# Patient Record
Sex: MALE | Race: WHITE | NOT HISPANIC OR LATINO | Employment: OTHER | URBAN - METROPOLITAN AREA
[De-identification: names, ages, dates, MRNs, and addresses within clinical notes are randomized per-mention and may not be internally consistent; named-entity substitution may affect disease eponyms.]

---

## 2017-01-06 ENCOUNTER — GENERIC CONVERSION - ENCOUNTER (OUTPATIENT)
Dept: OTHER | Facility: OTHER | Age: 72
End: 2017-01-06

## 2017-01-26 ENCOUNTER — GENERIC CONVERSION - ENCOUNTER (OUTPATIENT)
Dept: OTHER | Facility: OTHER | Age: 72
End: 2017-01-26

## 2017-03-30 ENCOUNTER — ALLSCRIPTS OFFICE VISIT (OUTPATIENT)
Dept: OTHER | Facility: OTHER | Age: 72
End: 2017-03-30

## 2017-03-30 DIAGNOSIS — K51.90 ULCERATIVE COLITIS WITHOUT COMPLICATIONS (HCC): ICD-10-CM

## 2017-03-30 DIAGNOSIS — G47.00 INSOMNIA: ICD-10-CM

## 2017-03-30 DIAGNOSIS — I51.9 HEART DISEASE: ICD-10-CM

## 2017-03-30 DIAGNOSIS — R22.1 LOCALIZED SWELLING, MASS OR LUMP OF NECK: ICD-10-CM

## 2017-03-30 DIAGNOSIS — E78.5 HYPERLIPIDEMIA: ICD-10-CM

## 2017-03-30 DIAGNOSIS — D72.819 DECREASED WHITE BLOOD CELL COUNT: ICD-10-CM

## 2017-04-17 ENCOUNTER — ALLSCRIPTS OFFICE VISIT (OUTPATIENT)
Dept: OTHER | Facility: OTHER | Age: 72
End: 2017-04-17

## 2017-04-20 ENCOUNTER — GENERIC CONVERSION - ENCOUNTER (OUTPATIENT)
Dept: OTHER | Facility: OTHER | Age: 72
End: 2017-04-20

## 2017-04-20 LAB
BASOPHILS # BLD AUTO: 0.6 %
BASOPHILS # BLD AUTO: 30 CELLS/UL (ref 0–200)
DEPRECATED RDW RBC AUTO: 14.5 % (ref 11–15)
EOSINOPHIL # BLD AUTO: 340 CELLS/UL (ref 15–500)
EOSINOPHIL # BLD AUTO: 6.8 %
HCT VFR BLD AUTO: 43 % (ref 38.5–50)
HGB BLD-MCNC: 14.3 G/DL (ref 13.2–17.1)
LYMPHOCYTES # BLD AUTO: 1325 CELLS/UL (ref 850–3900)
LYMPHOCYTES # BLD AUTO: 26.5 %
MCH RBC QN AUTO: 30.6 PG (ref 27–33)
MCHC RBC AUTO-ENTMCNC: 33.3 G/DL (ref 32–36)
MCV RBC AUTO: 92.1 FL (ref 80–100)
MONOCYTES # BLD AUTO: 445 CELLS/UL (ref 200–950)
MONOCYTES (HISTORICAL): 8.9 %
NEUTROPHILS # BLD AUTO: 2860 CELLS/UL (ref 1500–7800)
NEUTROPHILS # BLD AUTO: 57.2 %
PLATELET # BLD AUTO: 182 THOUSAND/UL (ref 140–400)
PMV BLD AUTO: 8.7 FL (ref 7.5–12.5)
RBC # BLD AUTO: 4.67 MILLION/UL (ref 4.2–5.8)
WBC # BLD AUTO: 5 THOUSAND/UL (ref 3.8–10.8)

## 2017-04-25 ENCOUNTER — HOSPITAL ENCOUNTER (OUTPATIENT)
Dept: ULTRASOUND IMAGING | Facility: MEDICAL CENTER | Age: 72
Discharge: HOME | End: 2017-04-25
Payer: MEDICARE

## 2017-04-25 DIAGNOSIS — R22.1 LOCALIZED SWELLING, MASS OR LUMP OF NECK: ICD-10-CM

## 2017-04-25 PROCEDURE — 76536 US EXAM OF HEAD AND NECK: CPT

## 2017-04-26 ENCOUNTER — GENERIC CONVERSION - ENCOUNTER (OUTPATIENT)
Dept: OTHER | Facility: OTHER | Age: 72
End: 2017-04-26

## 2017-05-09 ENCOUNTER — GENERIC CONVERSION - ENCOUNTER (OUTPATIENT)
Dept: OTHER | Facility: OTHER | Age: 72
End: 2017-05-09

## 2017-05-10 ENCOUNTER — APPOINTMENT (OUTPATIENT)
Dept: AUDIOLOGY | Age: 72
End: 2017-05-10
Payer: MEDICARE

## 2017-05-10 ENCOUNTER — GENERIC CONVERSION - ENCOUNTER (OUTPATIENT)
Dept: OTHER | Facility: OTHER | Age: 72
End: 2017-05-10

## 2017-05-10 DIAGNOSIS — I51.9 HEART DISEASE: ICD-10-CM

## 2017-05-10 DIAGNOSIS — G47.00 INSOMNIA: ICD-10-CM

## 2017-05-10 DIAGNOSIS — D72.819 DECREASED WHITE BLOOD CELL COUNT: ICD-10-CM

## 2017-05-10 DIAGNOSIS — K51.90 ULCERATIVE COLITIS WITHOUT COMPLICATIONS (HCC): ICD-10-CM

## 2017-05-10 DIAGNOSIS — E78.5 HYPERLIPIDEMIA: ICD-10-CM

## 2017-05-10 PROCEDURE — 92557 COMPREHENSIVE HEARING TEST: CPT | Performed by: AUDIOLOGIST

## 2017-05-10 PROCEDURE — 92567 TYMPANOMETRY: CPT | Performed by: AUDIOLOGIST

## 2017-09-06 ENCOUNTER — LAB CONVERSION - ENCOUNTER (OUTPATIENT)
Dept: OTHER | Facility: OTHER | Age: 72
End: 2017-09-06

## 2017-09-06 LAB
A/G RATIO (HISTORICAL): 2.1 (CALC) (ref 1–2.5)
ALBUMIN SERPL BCP-MCNC: 4.6 G/DL (ref 3.6–5.1)
ALP SERPL-CCNC: 44 U/L (ref 40–115)
ALT SERPL W P-5'-P-CCNC: 16 U/L (ref 9–46)
AST SERPL W P-5'-P-CCNC: 21 U/L (ref 10–35)
BILIRUB SERPL-MCNC: 0.8 MG/DL (ref 0.2–1.2)
BUN SERPL-MCNC: 18 MG/DL (ref 7–25)
BUN/CREA RATIO (HISTORICAL): ABNORMAL (CALC) (ref 6–22)
CALCIUM (ADJUSTED FOR ALBUMIN) (HISTORICAL): 10 MG/DL (CALC) (ref 8.6–10.2)
CALCIUM SERPL-MCNC: 10.1 MG/DL (ref 8.6–10.3)
CHLORIDE SERPL-SCNC: 104 MMOL/L (ref 98–110)
CHOLEST SERPL-MCNC: 181 MG/DL
CHOLEST/HDLC SERPL: 2.5 (CALC)
CO2 SERPL-SCNC: 28 MMOL/L (ref 20–31)
CREAT SERPL-MCNC: 1.17 MG/DL (ref 0.7–1.18)
EGFR AFRICAN AMERICAN (HISTORICAL): 72 ML/MIN/1.73M2
EGFR-AMERICAN CALC (HISTORICAL): 62 ML/MIN/1.73M2
GAMMA GLOBULIN (HISTORICAL): 2.2 G/DL (CALC) (ref 1.9–3.7)
GLUCOSE (HISTORICAL): 100 MG/DL (ref 65–99)
HDLC SERPL-MCNC: 71 MG/DL
LDL CHOLESTEROL (HISTORICAL): 99 MG/DL (CALC)
NON-HDL-CHOL (CHOL-HDL) (HISTORICAL): 110 MG/DL (CALC)
POTASSIUM SERPL-SCNC: 4 MMOL/L (ref 3.5–5.3)
PROSTATE SPECIFIC ANTIGEN TOTAL (HISTORICAL): 0.7 NG/ML
SODIUM SERPL-SCNC: 141 MMOL/L (ref 135–146)
TOTAL PROTEIN (HISTORICAL): 6.8 G/DL (ref 6.1–8.1)
TRIGL SERPL-MCNC: 37 MG/DL
TSH SERPL DL<=0.05 MIU/L-ACNC: 1.05 MIU/L (ref 0.4–4.5)

## 2017-09-13 ENCOUNTER — ALLSCRIPTS OFFICE VISIT (OUTPATIENT)
Dept: OTHER | Facility: OTHER | Age: 72
End: 2017-09-13

## 2017-10-26 NOTE — PROGRESS NOTES
Assessment  1  Insomnia (780 52) (G47 00)   2  Hyperlipidemia (272 4) (E78 5)   3  Ulcerative colitis without complications (676 9) (I54 01)    Plan  Insomnia    · Zolpidem Tartrate 5 MG Oral Tablet; TAKE 1 TABLET AT BEDTIME AS NEEDED    Discussion/Summary    #1  Insomnia? patient was given a prescription for Ambien 5 mg one at bedtime when necessary #10  He will use this prescription on his drive to [facility] so that he would be able to sleep at night  Hyperlipidemia? cholesterol numbers are stable with LDL now 99 down from 125  He is not on any medication for his cholesterol  Ulcerative colitis?stable  to keep his cholesterol numbers down and his colitis in check  was not given a follow-up because he is moving to [facility] permanently in the next week or so  Possible side effects of new medications were reviewed with the patient/guardian today  The treatment plan was reviewed with the patient/guardian  The patient/guardian understands and agrees with the treatment plan     Self Referrals: No      Chief Complaint  Follow up hyperlipidemiaankle pain - no specific injuryon neck he would like checked immunization declined - [facility]      History of Present Illness  This is a 72-year-old male who comes in for his regular six-month visit  He has 2 minor problems  The first problem involves right ankle pain and minor swelling which occurred after he was sitting on a stool at the bar in a restaurant  When he got up he was have an ankle pain after sitting for a prolonged period of time  The ankle has swollen but recently it is starting now to get better in that the swelling is down and the pain is negligent  He also has a small raised spot on the side of his neck and was wondering if he should go to dermatology  He is currently moving out of the area and relocating to [facility] within a week and will seek a dermatologist and a new physician for his care out there   Reviewing his labs his cholesterol numbers are stable with the LDL coming down from 125-99  His PSA stayed the same at 0 7 and his glucose went up from   His TSH is within normal range  He is requesting a small scription for Ambien because he is driving out to [facility] and he would like to get a good night sleep when he pulls over to sleep for the night  He has had Ambien 5 mg in the past and is requesting 10 tablets  His ulcerative colitis is stable at the present time  Review of Systems    Constitutional: No fever or chills, feels well, no tiredness, no recent weight gain or weight loss  ENT: no complaints of earache, no hearing loss, no nosebleeds, no nasal discharge, no sore throat, no hoarseness  Cardiovascular: No complaints of slow heart rate, no fast heart rate, no chest pain, no palpitations, no leg claudication, no lower extremity  Respiratory: No complaints of shortness of breath, no wheezing, no cough, no SOB on exertion, no orthopnea or PND  Gastrointestinal: No complaints of abdominal pain, no constipation, no nausea or vomiting, no diarrhea or bloody stools  Musculoskeletal: Slight swelling of right ankle, but-- as noted in HPI,-- no arthralgias,-- no joint swelling,-- no limb pain,-- no myalgias-- and-- no limb swelling  Integumentary: Odin Herd raised lesion right side of neck, but-- as noted in HPI,-- no rashes,-- no itching,-- no dry skin-- and-- no skin wound--    The patient presents with complaints of gradual onset of mild right neck skin lesion, described as raised and white  ROS reviewed  Active Problems  1  Bulging lumbar disc (722 10) (M51 26)   2  Cerumen impaction (380 4) (H61 20)   3  Colonoscopy (Fiberoptic) Screening   4  Decreased hearing of both ears (389 9) (H91 93)   5  Diastolic dysfunction (872 5) (I51 9)   6  Encounter for prostate cancer screening (V76 44) (Z12 5)   7  Encounter for screening colonoscopy (V76 51) (Z12 11)   8  Foot pain (729 5) (M79 673)   9  Ganglion of right wrist (727 41) (M67 431)   10  Hyperlipidemia (272 4) (E78 5)   11  Insomnia (780 52) (G47 00)   12  Leukopenia (288 50) (D72 819)   13  Lower back pain (724 2) (M54 5)   14  Lump in throat (784 2) (R22 1)   15  Myalgia and myositis (729 1)   16  Neutropenia (288 00) (D70 9)   17  Palpitations (785 1) (R00 2)   18  Paresthesia of hand (782 0) (R20 2)   19  Sciatica (724 3) (M54 30)   20  Screening for genitourinary condition (V81 6) (Z13 89)   21  Screening for glaucoma (V80 1) (Z13 5)   22  Screening for neurological condition (V80 09) (Z13 89)   23  Sensation of lump in throat (784 99) (R22 1)   24  Testicular discomfort (608 9) (N50 819)   25  Ulcerative colitis without complications (496 3) (Q17 53)    Past Medical History  1  History of Alt[place] sickness prophylaxis (V07 8) (Z29 8)   2  History of acute sinusitis (V12 69) (Z87 09)   3  History of diarrhea (V12 79) (Z87 898)   4  History of Malignant Neoplasm Of The Bladder (V10 51)   5  History of Nephrolithiasis (V13 01)    The active problems and past medical history were reviewed and updated today  Surgical History  1  History of Tonsillectomy With Adenoidectomy    The surgical history was reviewed and updated today  Family History  Mother    1  Family history of Gastric Cancer (V16 0)  Father    2  Family history of Hypertension   3  Family history of Pneumonia  Maternal Grandfather    4  Family history of Stroke Syndrome (V17 1)  Maternal Uncle    5  Family history of Tuberculosis    The family history was reviewed and updated today  Social History   · Alcohol drinker   ·    · Never a smoker   · No secondhand smoke exposure (V49 89) (Z78 9)   · Retired  The social history was reviewed and updated today  The social history was reviewed and is unchanged  Current Meds   1  Canasa 1000 MG Rectal Suppository; INSERT 1 SUPPOSITORY RECTALLY TWICE   DAILY; Therapy: 26DHR4930 to (Evaluate:13Qnd1861); Last Rx:30Mar2017 Ordered   2  [facility] Fish Oil CAPS Recorded   3  HydroCHLOROthiazide 25 MG Oral Tablet; Therapy: 84PYQ4340 to Recorded   4  Latanoprost 0 005 % Ophthalmic Solution; Therapy: 13Sep2017 to Recorded   5  Potassium Citrate ER 15 MEQ (1620 MG) Oral Tablet Extended Release; Therapy: (Recorded:26Jan2015) to Recorded    The medication list was reviewed and updated today  Allergies  1  Fluzone High-Dose SUSP    Vitals  Vital Signs    Recorded: 13Sep2017 08:39AM   Heart Rate 80, L Radial   Pulse Quality Regular, L Radial   Systolic 760, LUE, Sitting   Diastolic 64, LUE, Sitting     Physical Exam    Constitutional   General appearance: No acute distress, well appearing and well nourished  Ears, Nose, Mouth, and Throat   External inspection of ears and nose: Normal     Otoscopic examination: Tympanic membrance translucent with normal light reflex  Canals patent without erythema  Oropharynx: Normal with no erythema, edema, exudate or lesions  Pulmonary   Auscultation of lungs: Clear to auscultation, equal breath sounds bilaterally, no wheezes, no rales, no rhonci  Cardiovascular   Auscultation of heart: Normal rate and rhythm, normal S1 and S2, without murmurs  Abdomen   Abdomen: Non-tender, no masses  Liver and spleen: No hepatomegaly or splenomegaly  Lymphatic   Palpation of lymph nodes in neck: No lymphadenopathy  Musculoskeletal   Gait and station: Normal     Inspection/palpation of joints, bones, and muscles: Abnormal  -- Minor swelling of right ankle with good range of motion and nontender to palpation  Pulses equal bilaterally     Psychiatric   Orientation to person, place and time: Normal     Mood and affect: Normal          Signatures   Electronically signed by : MONI Stuart; Sep 13 2017 11:07AM EST                       (Author)    Electronically signed by : SAMMY Serrano ; Sep 13 2017 12:12PM EST

## 2017-11-17 ENCOUNTER — GENERIC CONVERSION - ENCOUNTER (OUTPATIENT)
Dept: OTHER | Facility: OTHER | Age: 72
End: 2017-11-17

## 2017-11-20 ENCOUNTER — GENERIC CONVERSION - ENCOUNTER (OUTPATIENT)
Dept: OTHER | Facility: OTHER | Age: 72
End: 2017-11-20

## 2018-01-11 NOTE — RESULT NOTES
Verified Results  US THYROID 25Apr2017 10:38AM Laverne Coats Order Number: [id number]    - Patient Instructions: To schedule this appointment, please contact Central Scheduling at [phone]  Test Name Result Flag Reference   US THYROID (Report)     THYROID ULTRASOUND     INDICATION: Sensation of a lump on right side of neck  COMPARISON: None  TECHNIQUE:  Ultrasound of the thyroid was performed with a high frequency linear transducer in transverse and sagittal planes including volumetric imaging sweeps as well as traditional still imaging technique  FINDINGS:   Normal homogeneous smooth echotexture  Right gland: 3 8 x 2 0 x 1 7 cm  No dominant nodules  Left gland: 4 0 x 2 2 x 1 8 cm  No dominant nodules  Isthmus: The isthmus is 0 4 cm in AP dimension             IMPRESSION:      Normal              Workstation performed: [id number]     Signed by:   Astrid Shipley MD   4/26/17

## 2018-01-12 NOTE — MISCELLANEOUS
Message   Recorded as Task   Date: 11/17/2017 02:18 PM, Created By: Sylvia Licona   Task Name: Call Back   Assigned To: Rayshawn ARTIS,TEAM   Regarding Patient: Phong Soriano, Status: Active   CommentOna Steve - 17 Nov 2017 2:18 PM     TASK CREATED  Caller: Self; [phone] (Home)  Pt has medication questions  [phone]   Odilia Kirk - 17 Nov 2017 2:39 PM     TASK EDITED  PT QUESTIONING IF HE STILL NEEDS TO CONTINUE HYDROCHLOROTHIAZIDE, PT NOT SURE WHY HE IS TAKING MED  DR Tevin Gaming TO BE NOTIFIED   Odilia Kirk - 17 Nov 2017 2:39 PM     TASK REASSIGNED: Previously Assigned To Thor FOR URO Kelsey Alvarez - 20 Nov 2017 9:54 AM     TASK EDITED  Patient can stop hctz  Juana Deanne - 20 Nov 2017 9:54 AM     TASK REPLIED TO: Previously Assigned To Preston Sánchez  stop hctz   Mervat Nova - 20 Nov 2017 10:31 AM     TASK EDITED  PT NOTIFIED  Active Problems    1  Bulging lumbar disc (722 10) (M51 26)   2  Cerumen impaction (380 4) (H61 20)   3  Colonoscopy (Fiberoptic) Screening   4  Decreased hearing of both ears (389 9) (H91 93)   5  Diastolic dysfunction (604 3) (I51 9)   6  Encounter for prostate cancer screening (V76 44) (Z12 5)   7  Encounter for screening colonoscopy (V76 51) (Z12 11)   8  Foot pain (729 5) (M79 673)   9  Ganglion of right wrist (727 41) (M67 431)   10  Hyperlipidemia (272 4) (E78 5)   11  Insomnia (780 52) (G47 00)   12  Leukopenia (288 50) (D72 819)   13  Lower back pain (724 2) (M54 5)   14  Lump in throat (784 2) (R22 1)   15  Myalgia and myositis (729 1)   16  Neutropenia (288 00) (D70 9)   17  Palpitations (785 1) (R00 2)   18  Paresthesia of hand (782 0) (R20 2)   19  Sciatica (724 3) (M54 30)   20  Screening for genitourinary condition (V81 6) (Z13 89)   21  Screening for glaucoma (V80 1) (Z13 5)   22  Screening for neurological condition (V80 09) (Z13 89)   23  Sensation of lump in throat (784 99) (R22 1)   24  Testicular discomfort (608 9) (N50 819)   25  Ulcerative colitis without complications (605 2) (H51 48)    Current Meds   1  Canasa 1000 MG Rectal Suppository; INSERT 1 SUPPOSITORY RECTALLY TWICE   DAILY; Therapy: 46AOT9820 to (Evaluate:20Apr2017); Last Rx:30Mar2017 Ordered   2  [facility] Fish Oil CAPS Recorded   3  HydroCHLOROthiazide 25 MG Oral Tablet; Therapy: 03DCS3942 to Recorded   4  Latanoprost 0 005 % Ophthalmic Solution; Therapy: 51Jpc3933 to Recorded   5  Potassium Citrate ER 15 MEQ (1620 MG) Oral Tablet Extended Release; Therapy: (ISXXUUAL:25BGW5793) to Recorded   6  Zolpidem Tartrate 5 MG Oral Tablet; TAKE 1 TABLET AT BEDTIME AS NEEDED; Therapy: 81Yzt0771 to (Evaluate:23Sep2017); Last Rx:13Sep2017 Ordered    Allergies    1   Fluzone High-Dose SUSP    Signatures   Electronically signed by : Geno Rose RN; Nov 20 2017 10:32AM EST                       (Author)

## 2018-01-13 NOTE — RESULT NOTES
Verified Results  (1) CBC/PLT/DIFF 87DWH8281 09:05AM Hortensia Ends   REPORT COMMENT:  FASTING:NO     Test Name Result Flag Reference   WHITE BLOOD CELL COUNT 5 0 Thousand/uL  3 8-10 8   RED BLOOD CELL COUNT 4 67 Million/uL  4 20-5 80   HEMOGLOBIN 14 3 g/dL  13 2-17 1   HEMATOCRIT 43 0 %  38 5-50 0   MCV 92 1 fL  80 0-100 0   MCH 30 6 pg  27 0-33 0   MCHC 33 3 g/dL  32 0-36 0   RDW 14 5 %  11 0-15 0   PLATELET COUNT 554 Thousand/uL  140-400   MPV 8 7 fL  7 5-12 5   ABSOLUTE NEUTROPHILS 2860 cells/uL  5397-0438   ABSOLUTE LYMPHOCYTES 1325 cells/uL  850-3900   ABSOLUTE MONOCYTES 445 cells/uL  200-950   ABSOLUTE EOSINOPHILS 340 cells/uL     ABSOLUTE BASOPHILS 30 cells/uL  0-200   NEUTROPHILS 57 2 %     LYMPHOCYTES 26 5 %     MONOCYTES 8 9 %     EOSINOPHILS 6 8 %     BASOPHILS 0 6 %

## 2018-01-14 VITALS — SYSTOLIC BLOOD PRESSURE: 100 MMHG | DIASTOLIC BLOOD PRESSURE: 64 MMHG | HEART RATE: 80 BPM

## 2018-01-14 VITALS
SYSTOLIC BLOOD PRESSURE: 110 MMHG | WEIGHT: 190 LBS | DIASTOLIC BLOOD PRESSURE: 64 MMHG | HEART RATE: 76 BPM | BODY MASS INDEX: 27.2 KG/M2 | HEIGHT: 70 IN

## 2018-01-15 VITALS
HEART RATE: 84 BPM | DIASTOLIC BLOOD PRESSURE: 60 MMHG | BODY MASS INDEX: 27.06 KG/M2 | SYSTOLIC BLOOD PRESSURE: 104 MMHG | WEIGHT: 189 LBS | HEIGHT: 70 IN

## 2018-01-15 NOTE — MISCELLANEOUS
Message   Recorded as Task   Date: 11/17/2017 02:18 PM, Created By: Jennifer Hudson   Task Name: Call Back   Assigned To: Rayshawn ARTIS,TEAM   Regarding Patient: Kathy Grimes, Status: Active   CommentBhupendraorelanvesna Desor - 17 Nov 2017 2:18 PM     TASK CREATED  Caller: Self; [phone] (Home)  Pt has medication questions  [phone]   Odilia Kirk - 17 Nov 2017 2:39 PM     TASK EDITED  PT QUESTIONING IF HE STILL NEEDS TO CONTINUE HYDROCHLOROTHIAZIDE, PT NOT SURE WHY HE IS TAKING MED  DR Sofiya Law TO BE NOTIFIED        Active Problems    1  Bulging lumbar disc (722 10) (M51 26)   2  Cerumen impaction (380 4) (H61 20)   3  Colonoscopy (Fiberoptic) Screening   4  Decreased hearing of both ears (389 9) (H91 93)   5  Diastolic dysfunction (232 6) (I51 9)   6  Encounter for prostate cancer screening (V76 44) (Z12 5)   7  Encounter for screening colonoscopy (V76 51) (Z12 11)   8  Foot pain (729 5) (M79 673)   9  Ganglion of right wrist (727 41) (M67 431)   10  Hyperlipidemia (272 4) (E78 5)   11  Insomnia (780 52) (G47 00)   12  Leukopenia (288 50) (D72 819)   13  Lower back pain (724 2) (M54 5)   14  Lump in throat (784 2) (R22 1)   15  Myalgia and myositis (729 1)   16  Neutropenia (288 00) (D70 9)   17  Palpitations (785 1) (R00 2)   18  Paresthesia of hand (782 0) (R20 2)   19  Sciatica (724 3) (M54 30)   20  Screening for genitourinary condition (V81 6) (Z13 89)   21  Screening for glaucoma (V80 1) (Z13 5)   22  Screening for neurological condition (V80 09) (Z13 89)   23  Sensation of lump in throat (784 99) (R22 1)   24  Testicular discomfort (608 9) (N50 819)   25  Ulcerative colitis without complications (165 3) (B32 10)    Current Meds   1  Canasa 1000 MG Rectal Suppository; INSERT 1 SUPPOSITORY RECTALLY TWICE   DAILY; Therapy: 15AKL0105 to (Evaluate:20Apr2017); Last Rx:30Mar2017 Ordered   2  [employer] Fish Oil CAPS Recorded   3  HydroCHLOROthiazide 25 MG Oral Tablet;    Therapy: 24WXU5980 to Recorded 4  Latanoprost 0 005 % Ophthalmic Solution; Therapy: 55Pwx7196 to Recorded   5  Potassium Citrate ER 15 MEQ (1620 MG) Oral Tablet Extended Release; Therapy: (UVSYCORO:14ROI3542) to Recorded   6  Zolpidem Tartrate 5 MG Oral Tablet; TAKE 1 TABLET AT BEDTIME AS NEEDED; Therapy: 86Omm1447 to (Evaluate:23Sep2017); Last Rx:81Pao1569 Ordered    Allergies    1   Fluzone High-Dose SUSP    Signatures   Electronically signed by : Praveena Hodge, ; Nov 17 2017  2:39PM EST                       (Author)

## 2018-04-06 ENCOUNTER — TELEPHONE (OUTPATIENT)
Dept: UROLOGY | Facility: MEDICAL CENTER | Age: 73
End: 2018-04-06

## 2018-04-06 NOTE — TELEPHONE ENCOUNTER
Received signed medical records request  Records faxed to Sydni Bergman MD [place] [phone]

## 2018-04-24 NOTE — TELEPHONE ENCOUNTER
Patient called again  Doctor's office in [place] never received the records  Please refax to: [phone]  Once faxed, please let patient know it was done  His # is [phone]

## 2018-04-24 NOTE — TELEPHONE ENCOUNTER
Records faxed to [phone]  Original Fax# given was [phone] and records were sent to that # 4/19/18

## 2018-05-09 NOTE — TELEPHONE ENCOUNTER
Records were faxed to [phone] on 4/24/18 with confirmation of receipt  Records faxed again today thru [facility]

## 2018-05-09 NOTE — TELEPHONE ENCOUNTER
Spoke with [facility] Swann, they did not get records on 4/19  They stated the [phone] is the phone, not the fax number  Can you please fax to [phone]? And also call Myesha Swann at [phone] ext [phone]  Thank you  They need records faxed yet again

## 2018-05-10 NOTE — TELEPHONE ENCOUNTER
Fax again timed out, per patient it appears to be a problem with the [facility] fax machine  Attempted to fax again today, also printed and mailed to [place] [address], [address], [place] [address]  Patient has an appointment with them 5/16/18